# Patient Record
Sex: FEMALE | Race: OTHER | HISPANIC OR LATINO | Employment: UNEMPLOYED | URBAN - METROPOLITAN AREA
[De-identification: names, ages, dates, MRNs, and addresses within clinical notes are randomized per-mention and may not be internally consistent; named-entity substitution may affect disease eponyms.]

---

## 2017-05-31 ENCOUNTER — HISTORICAL (OUTPATIENT)
Dept: ADMINISTRATIVE | Facility: HOSPITAL | Age: 32
End: 2017-05-31

## 2017-05-31 LAB
ABS NEUT (OLG): 5.62 X10(3)/MCL (ref 2.1–9.2)
BASOPHILS # BLD AUTO: 0.03 X10(3)/MCL
BASOPHILS NFR BLD AUTO: 0 % (ref 0–1)
BILIRUB SERPL-MCNC: NEGATIVE MG/DL
BLOOD URINE, POC: NEGATIVE
BUN SERPL-MCNC: 7 MG/DL (ref 7–25)
CALCIUM SERPL-MCNC: 9.3 MG/DL (ref 8.4–10.3)
CHLORIDE SERPL-SCNC: 104 MMOL/L (ref 96–110)
CLARITY, POC UA: CLEAR
CO2 SERPL-SCNC: 24 MMOL/L (ref 24–32)
COLOR, POC UA: NORMAL
CREAT SERPL-MCNC: 0.42 MG/DL (ref 0.7–1.1)
EOSINOPHIL # BLD AUTO: 0.1 X10(3)/MCL
EOSINOPHIL NFR BLD AUTO: 1 % (ref 0–5)
ERYTHROCYTE [DISTWIDTH] IN BLOOD BY AUTOMATED COUNT: 12.7 % (ref 11.5–14.5)
GLUCOSE SERPL-MCNC: 96 MG/DL (ref 65–99)
GLUCOSE UR QL STRIP: NEGATIVE
HBV SURFACE AG SERPL QL IA: NEGATIVE
HCT VFR BLD AUTO: 36.7 % (ref 35–46)
HCV AB SERPL QL IA: NONREACTIVE
HGB BLD-MCNC: 12.6 GM/DL (ref 12–16)
HIV 1+2 AB+HIV1 P24 AG SERPL QL IA: NONREACTIVE
IMM GRANULOCYTES # BLD AUTO: 0.04 10*3/UL
IMM GRANULOCYTES NFR BLD AUTO: 0 %
KETONES UR QL STRIP: NEGATIVE
LEUKOCYTE EST, POC UA: NEGATIVE
LYMPHOCYTES # BLD AUTO: 1.88 X10(3)/MCL
LYMPHOCYTES NFR BLD AUTO: 23 % (ref 15–40)
MCH RBC QN AUTO: 28 PG (ref 26–34)
MCHC RBC AUTO-ENTMCNC: 34.3 GM/DL (ref 31–37)
MCV RBC AUTO: 81.6 FL (ref 80–100)
MONOCYTES # BLD AUTO: 0.38 X10(3)/MCL
MONOCYTES NFR BLD AUTO: 5 % (ref 4–12)
NEUTROPHILS # BLD AUTO: 5.62 X10(3)/MCL
NEUTROPHILS NFR BLD AUTO: 70 X10(3)/MCL
NITRITE, POC UA: NEGATIVE
PH, POC UA: 5
PLATELET # BLD AUTO: 338 X10(3)/MCL (ref 130–400)
PMV BLD AUTO: 10.2 FL (ref 7.4–10.4)
POTASSIUM SERPL-SCNC: 3.5 MMOL/L (ref 3.6–5.2)
PROTEIN, POC: NEGATIVE
RBC # BLD AUTO: 4.5 X10(6)/MCL (ref 4–5.2)
RPR SER QL: NORMAL
SODIUM SERPL-SCNC: 136 MMOL/L (ref 135–146)
SPECIFIC GRAVITY, POC UA: 1.01
UROBILINOGEN, POC UA: NORMAL
WBC # SPEC AUTO: 8 X10(3)/MCL (ref 4.5–11)

## 2017-06-02 LAB — FINAL CULTURE: NO GROWTH

## 2017-07-05 LAB
BILIRUB SERPL-MCNC: NEGATIVE MG/DL
BLOOD URINE, POC: NEGATIVE
CLARITY, POC UA: CLEAR
COLOR, POC UA: YELLOW
GLUCOSE UR QL STRIP: NEGATIVE
KETONES UR QL STRIP: NEGATIVE
LEUKOCYTE EST, POC UA: NEGATIVE
NITRITE, POC UA: NEGATIVE
PH, POC UA: 6.5
PROTEIN, POC: NEGATIVE
SPECIFIC GRAVITY, POC UA: 1.01
UROBILINOGEN, POC UA: NORMAL

## 2017-07-21 ENCOUNTER — HISTORICAL (OUTPATIENT)
Dept: ADMINISTRATIVE | Facility: HOSPITAL | Age: 32
End: 2017-07-21

## 2017-07-21 LAB
ALBUMIN SERPL-MCNC: 3.7 GM/DL (ref 3.4–5)
ALBUMIN/GLOB SERPL: 1 RATIO (ref 1–2)
ALP SERPL-CCNC: 68 UNIT/L (ref 45–117)
ALT SERPL-CCNC: 26 UNIT/L (ref 12–78)
AST SERPL-CCNC: 17 UNIT/L (ref 15–37)
BILIRUB SERPL-MCNC: 0.4 MG/DL (ref 0.2–1)
BILIRUB SERPL-MCNC: NEGATIVE MG/DL
BILIRUBIN DIRECT+TOT PNL SERPL-MCNC: 0.1 MG/DL
BILIRUBIN DIRECT+TOT PNL SERPL-MCNC: 0.3 MG/DL
BLOOD URINE, POC: NEGATIVE
BUN SERPL-MCNC: 6 MG/DL (ref 7–18)
CALCIUM SERPL-MCNC: 9 MG/DL (ref 8.5–10.1)
CHLORIDE SERPL-SCNC: 102 MMOL/L (ref 98–107)
CLARITY, POC UA: CLEAR
CO2 SERPL-SCNC: 26 MMOL/L (ref 21–32)
COLOR, POC UA: NORMAL
CREAT SERPL-MCNC: 0.4 MG/DL (ref 0.6–1.3)
EST. AVERAGE GLUCOSE BLD GHB EST-MCNC: 114 MG/DL
GLOBULIN SER-MCNC: 4 GM/ML (ref 2.3–3.5)
GLUCOSE SERPL-MCNC: 75 MG/DL (ref 74–106)
GLUCOSE UR QL STRIP: NORMAL
HBA1C MFR BLD: 5.6 % (ref 4.2–6.3)
KETONES UR QL STRIP: NEGATIVE
LEUKOCYTE EST, POC UA: NORMAL
NITRITE, POC UA: NEGATIVE
PH, POC UA: 6
POTASSIUM SERPL-SCNC: 3.7 MMOL/L (ref 3.5–5.1)
PROT SERPL-MCNC: 7.7 GM/DL (ref 6.4–8.2)
PROTEIN, POC: NEGATIVE
SODIUM SERPL-SCNC: 137 MMOL/L (ref 136–145)
SPECIFIC GRAVITY, POC UA: 1
UROBILINOGEN, POC UA: NORMAL

## 2017-07-24 ENCOUNTER — HISTORICAL (OUTPATIENT)
Dept: ADMINISTRATIVE | Facility: HOSPITAL | Age: 32
End: 2017-07-24

## 2017-07-24 LAB
BILIRUB SERPL-MCNC: NEGATIVE MG/DL
BLOOD URINE, POC: NEGATIVE
CLARITY, POC UA: NORMAL
COLOR, POC UA: YELLOW
GLUCOSE 1H P 100 G GLC PO SERPL-MCNC: 154 MG/DL
GLUCOSE UR QL STRIP: NEGATIVE
KETONES UR QL STRIP: NEGATIVE
LEUKOCYTE EST, POC UA: NORMAL
NITRITE, POC UA: NEGATIVE
PH, POC UA: 6.5
PROTEIN, POC: NORMAL
SPECIFIC GRAVITY, POC UA: 1.01
UROBILINOGEN, POC UA: NORMAL

## 2017-08-07 ENCOUNTER — HISTORICAL (OUTPATIENT)
Dept: ADMINISTRATIVE | Facility: HOSPITAL | Age: 32
End: 2017-08-07

## 2017-08-07 LAB
GLUCOSE 1H P 100 G GLC PO SERPL-MCNC: 135 MG/DL
GLUCOSE 2H P 100 G GLC PO SERPL-MCNC: 112 MG/DL (ref 65–139)
GLUCOSE 3H P 100 G GLC PO SERPL-MCNC: 69 MG/DL
GLUCOSE P FAST SERPL-MCNC: 84 MG/DL (ref 65–99)

## 2017-08-14 ENCOUNTER — HISTORICAL (OUTPATIENT)
Dept: FAMILY MEDICINE | Facility: CLINIC | Age: 32
End: 2017-08-14

## 2017-08-14 LAB
BILIRUB SERPL-MCNC: NEGATIVE MG/DL
BLOOD URINE, POC: NEGATIVE
CLARITY, POC UA: CLEAR
COLOR, POC UA: NORMAL
GLUCOSE UR QL STRIP: NEGATIVE
KETONES UR QL STRIP: NEGATIVE
LEUKOCYTE EST, POC UA: NORMAL
NITRITE, POC UA: NEGATIVE
PH, POC UA: 5
PROTEIN, POC: NEGATIVE
SPECIFIC GRAVITY, POC UA: 1.02
UROBILINOGEN, POC UA: NORMAL

## 2017-09-15 LAB
BILIRUB SERPL-MCNC: NEGATIVE MG/DL
BLOOD URINE, POC: NEGATIVE
CLARITY, POC UA: NORMAL
COLOR, POC UA: YELLOW
GLUCOSE UR QL STRIP: NORMAL
KETONES UR QL STRIP: NEGATIVE
LEUKOCYTE EST, POC UA: NORMAL
NITRITE, POC UA: NEGATIVE
PH, POC UA: 6.5
PROTEIN, POC: NEGATIVE
SPECIFIC GRAVITY, POC UA: 1.01
UROBILINOGEN, POC UA: NORMAL

## 2017-10-12 ENCOUNTER — HISTORICAL (OUTPATIENT)
Dept: ADMINISTRATIVE | Facility: HOSPITAL | Age: 32
End: 2017-10-12

## 2017-10-12 LAB
APPEARANCE, UA: NORMAL
BACTERIA #/AREA URNS AUTO: ABNORMAL /[HPF]
BILIRUB SERPL-MCNC: NEGATIVE MG/DL
BILIRUB UR QL STRIP: NEGATIVE
BLOOD URINE, POC: NEGATIVE
CLARITY, POC UA: CLEAR
COLOR UR: YELLOW
COLOR, POC UA: YELLOW
GLUCOSE (UA): NORMAL
GLUCOSE UR QL STRIP: NEGATIVE
HGB UR QL STRIP: NEGATIVE
HYALINE CASTS #/AREA URNS LPF: ABNORMAL /[LPF]
KETONES UR QL STRIP: NEGATIVE
KETONES UR QL STRIP: NEGATIVE
LEUKOCYTE EST, POC UA: NEGATIVE
LEUKOCYTE ESTERASE UR QL STRIP: NEGATIVE
NITRITE UR QL STRIP: NEGATIVE
NITRITE, POC UA: NEGATIVE
PH UR STRIP: 7 [PH] (ref 4.5–8)
PH, POC UA: 7.5
PROT UR QL STRIP: NEGATIVE
PROTEIN, POC: NEGATIVE
RBC #/AREA URNS AUTO: ABNORMAL /[HPF]
SP GR UR STRIP: 1.01 (ref 1–1.03)
SPECIFIC GRAVITY, POC UA: 1.01
SQUAMOUS #/AREA URNS LPF: ABNORMAL /[LPF]
UROBILINOGEN UR STRIP-ACNC: NORMAL
UROBILINOGEN, POC UA: NORMAL
WBC #/AREA URNS AUTO: ABNORMAL /HPF

## 2017-10-14 LAB — FINAL CULTURE: NO GROWTH

## 2017-11-02 LAB
BILIRUB SERPL-MCNC: NEGATIVE MG/DL
BLOOD URINE, POC: NEGATIVE
CLARITY, POC UA: NORMAL
COLOR, POC UA: NORMAL
GLUCOSE UR QL STRIP: NEGATIVE
KETONES UR QL STRIP: NEGATIVE
LEUKOCYTE EST, POC UA: NORMAL
NITRITE, POC UA: NEGATIVE
PH, POC UA: 6
PROTEIN, POC: NORMAL
SPECIFIC GRAVITY, POC UA: 1.02
UROBILINOGEN, POC UA: NORMAL

## 2017-11-17 LAB
BILIRUB SERPL-MCNC: NEGATIVE MG/DL
BLOOD URINE, POC: NEGATIVE
CLARITY, POC UA: NORMAL
COLOR, POC UA: YELLOW
GLUCOSE UR QL STRIP: NEGATIVE
KETONES UR QL STRIP: NEGATIVE
LEUKOCYTE EST, POC UA: NEGATIVE
NITRITE, POC UA: NEGATIVE
PH, POC UA: 6.5
PROTEIN, POC: NORMAL
SPECIFIC GRAVITY, POC UA: 1
UROBILINOGEN, POC UA: NORMAL

## 2017-11-28 ENCOUNTER — HISTORICAL (OUTPATIENT)
Dept: ADMINISTRATIVE | Facility: HOSPITAL | Age: 32
End: 2017-11-28

## 2017-11-28 LAB
APPEARANCE, UA: ABNORMAL
BACTERIA #/AREA URNS AUTO: ABNORMAL /[HPF]
BILIRUB SERPL-MCNC: NEGATIVE MG/DL
BILIRUB UR QL STRIP: NEGATIVE
BLOOD URINE, POC: NEGATIVE
CLARITY, POC UA: NORMAL
COLOR UR: YELLOW
COLOR, POC UA: YELLOW
GLUCOSE (UA): NORMAL
GLUCOSE UR QL STRIP: NEGATIVE
HGB UR QL STRIP: NEGATIVE
HYALINE CASTS #/AREA URNS LPF: ABNORMAL /[LPF]
KETONES UR QL STRIP: NEGATIVE
KETONES UR QL STRIP: NEGATIVE
LEUKOCYTE EST, POC UA: NORMAL
LEUKOCYTE ESTERASE UR QL STRIP: 500 LEU/UL
NITRITE UR QL STRIP: NEGATIVE
NITRITE, POC UA: NEGATIVE
PH UR STRIP: 6 [PH] (ref 4.5–8)
PH, POC UA: 6.5
PROT UR QL STRIP: NEGATIVE
PROTEIN, POC: NORMAL
RBC #/AREA URNS AUTO: ABNORMAL /[HPF]
SP GR UR STRIP: 1.01 (ref 1–1.03)
SPECIFIC GRAVITY, POC UA: 1.01
SQUAMOUS #/AREA URNS LPF: >100 /[LPF]
UROBILINOGEN UR STRIP-ACNC: NORMAL
UROBILINOGEN, POC UA: NORMAL
WBC #/AREA URNS AUTO: ABNORMAL /HPF

## 2017-11-30 LAB — FINAL CULTURE: NORMAL

## 2017-12-13 LAB
BILIRUB SERPL-MCNC: NEGATIVE MG/DL
BLOOD URINE, POC: NEGATIVE
CLARITY, POC UA: CLEAR
COLOR, POC UA: YELLOW
GLUCOSE UR QL STRIP: NEGATIVE
KETONES UR QL STRIP: NEGATIVE
LEUKOCYTE EST, POC UA: NORMAL
NITRITE, POC UA: NEGATIVE
PH, POC UA: 6
PROTEIN, POC: NORMAL
SPECIFIC GRAVITY, POC UA: 1.02
UROBILINOGEN, POC UA: NORMAL

## 2017-12-27 ENCOUNTER — HISTORICAL (OUTPATIENT)
Dept: ADMINISTRATIVE | Facility: HOSPITAL | Age: 32
End: 2017-12-27

## 2017-12-27 LAB
ABS NEUT (OLG): 7.23 X10(3)/MCL (ref 2.1–9.2)
BASOPHILS # BLD AUTO: 0.04 X10(3)/MCL
BASOPHILS NFR BLD AUTO: 0 % (ref 0–1)
BILIRUB SERPL-MCNC: NEGATIVE MG/DL
BLOOD URINE, POC: NEGATIVE
CLARITY, POC UA: CLEAR
COLOR, POC UA: YELLOW
EOSINOPHIL # BLD AUTO: 0.09 X10(3)/MCL
EOSINOPHIL NFR BLD AUTO: 1 % (ref 0–5)
ERYTHROCYTE [DISTWIDTH] IN BLOOD BY AUTOMATED COUNT: 13 % (ref 11.5–14.5)
GLUCOSE UR QL STRIP: NEGATIVE
HCT VFR BLD AUTO: 37.4 % (ref 35–46)
HGB BLD-MCNC: 12.9 GM/DL (ref 12–16)
HIV 1+2 AB+HIV1 P24 AG SERPL QL IA: NONREACTIVE
IMM GRANULOCYTES # BLD AUTO: 0.14 10*3/UL
IMM GRANULOCYTES NFR BLD AUTO: 1 %
LEUKOCYTE EST, POC UA: NORMAL
LYMPHOCYTES # BLD AUTO: 1.83 X10(3)/MCL
LYMPHOCYTES NFR BLD AUTO: 18 % (ref 15–40)
MCH RBC QN AUTO: 28.7 PG (ref 26–34)
MCHC RBC AUTO-ENTMCNC: 34.5 GM/DL (ref 31–37)
MCV RBC AUTO: 83.3 FL (ref 80–100)
MONOCYTES # BLD AUTO: 0.55 X10(3)/MCL
MONOCYTES NFR BLD AUTO: 6 % (ref 4–12)
NEUTROPHILS # BLD AUTO: 7.23 X10(3)/MCL
NEUTROPHILS NFR BLD AUTO: 73 X10(3)/MCL
NITRITE, POC UA: NEGATIVE
PH, POC UA: 7
PLATELET # BLD AUTO: 245 X10(3)/MCL (ref 130–400)
PMV BLD AUTO: 10.4 FL (ref 7.4–10.4)
PROTEIN, POC: NORMAL
RBC # BLD AUTO: 4.49 X10(6)/MCL (ref 4–5.2)
SPECIFIC GRAVITY, POC UA: 1.01
T PALLIDUM AB SER QL: NONREACTIVE
UROBILINOGEN, POC UA: NORMAL
WBC # SPEC AUTO: 9.9 X10(3)/MCL (ref 4.5–11)

## 2017-12-29 LAB — FINAL CULTURE: NORMAL

## 2018-01-04 LAB
BILIRUB SERPL-MCNC: NORMAL MG/DL
BLOOD URINE, POC: NEGATIVE
CLARITY, POC UA: NORMAL
COLOR, POC UA: NORMAL
GLUCOSE UR QL STRIP: NEGATIVE
KETONES UR QL STRIP: NEGATIVE
LEUKOCYTE EST, POC UA: NORMAL
NITRITE, POC UA: NEGATIVE
PH, POC UA: 6.5
PROTEIN, POC: NORMAL
SPECIFIC GRAVITY, POC UA: 1.02
UROBILINOGEN, POC UA: NORMAL

## 2018-01-18 ENCOUNTER — HISTORICAL (OUTPATIENT)
Dept: ADMINISTRATIVE | Facility: HOSPITAL | Age: 33
End: 2018-01-18

## 2018-01-18 LAB
APPEARANCE, UA: ABNORMAL
BACTERIA #/AREA URNS AUTO: ABNORMAL /[HPF]
BILIRUB UR QL STRIP: NEGATIVE
COLOR UR: YELLOW
GLUCOSE (UA): NORMAL
HGB UR QL STRIP: 1 MG/DL
HYALINE CASTS #/AREA URNS LPF: ABNORMAL /[LPF]
KETONES UR QL STRIP: NEGATIVE
LEUKOCYTE ESTERASE UR QL STRIP: 500 LEU/UL
MUCOUS THREADS URNS QL MICRO: ABNORMAL
NITRITE UR QL STRIP: NEGATIVE
PH UR STRIP: 5.5 [PH] (ref 4.5–8)
PROT UR QL STRIP: 50 MG/DL
RBC #/AREA URNS AUTO: >=100 /[HPF]
SP GR UR STRIP: 1.03 (ref 1–1.03)
SQUAMOUS #/AREA URNS LPF: ABNORMAL /[LPF]
UROBILINOGEN UR STRIP-ACNC: 2 MG/DL
WBC #/AREA URNS AUTO: ABNORMAL /HPF

## 2018-01-20 LAB — FINAL CULTURE: NORMAL

## 2022-04-11 ENCOUNTER — HISTORICAL (OUTPATIENT)
Dept: ADMINISTRATIVE | Facility: HOSPITAL | Age: 37
End: 2022-04-11

## 2022-04-25 VITALS
SYSTOLIC BLOOD PRESSURE: 129 MMHG | DIASTOLIC BLOOD PRESSURE: 86 MMHG | OXYGEN SATURATION: 98 % | WEIGHT: 156.63 LBS | HEIGHT: 68 IN | BODY MASS INDEX: 23.74 KG/M2

## 2022-04-30 NOTE — PROGRESS NOTES
Patient:   Anastasia Edmondson             MRN: [id number]            FIN: [id number]               Age:   32 years     Sex:  Female     :  1985   Associated Diagnoses:   None   Author:   Haritha Harding MD      Visit Information   Visit type:  Scheduled follow-up.    Accompanied by:  No one.    Source of history:  Self, patient translated through Dr. Good.    History limitation:  None.       Chief Complaint   OB follow up      History of Present Illness   31 year old   female @ 32^2 EGA with EDC of 18 confirmed by 1st trimester ultrasound presents for antepartum visit.   Patient is Emir[place] speaking only, Dr Good was there to translate for the entire clinical encounter.     Chronic Issue and Treatment: none    Acute complaints: Round ligament pain mainly in left lower quadrant, denies dysuria or increased urinary frequency. Compliant with prenatal vitamins.    OB Review of Systems  Fetal movements: yes  Vaginal bleeding: denies  Vaginal discharge: denies  Loss of fluid: denies  Contractions: denies  Headaches: denies  Vision changes: denies  Edema: denies    Pregnancy History:  - G1: 10/2012  at 37WGA, viable female infant 3629 gm  - G2:  SAB  - G3: current  GYN History: ? at menarche, periods [id number], moderate; LMP 4/10 or 26/17; no h/o abnormal Paps, no h/o STDs  Medical History: none  Surgical History: none  Social History: Denies tobacco/drugs/alcohol  Medications: PNVs      Physical Examination   Vital Signs   2017 10:42 CST     Temperature Oral          36.6 DegC                             Temperature Oral (calculated)             97.88 DegF                             Peripheral Pulse Rate     76 bpm                             Respiratory Rate          16 br/min                             SpO2                      97 %                             Systolic Blood Pressure   126 mmHg                             Diastolic Blood Pressure  76 mmHg                              Blood Pressure Location   Right arm                             Blood Pressure Cuff Size  Adult small     General:  Alert and oriented, No acute distress.    Eye:  Pupils are equal, round and reactive to light, Extraocular movements are intact, Normal conjunctiva.    HENT:  Tympanic membranes are clear, Oral mucosa is moist, No pharyngeal erythema, No sinus tenderness.    Neck:  Supple, Non-tender.    Respiratory:  Lungs are clear to auscultation, Respirations are non-labored, Breath sounds are equal, Symmetrical chest wall expansion.    Cardiovascular:  Normal rate, Regular rhythm, Good pulses equal in all extremities, No edema.    Gastrointestinal:  Soft, Non-distended, gravid, mild generalized tenderness.    Obstetric Exam     Uterus: fundal height 32  cm, consistent with gestational age.     Johns/ Baby A fetal evaluation: heart tones 140  bpm not with Doppler scan.     no fundal tenderness.     Musculoskeletal:  No CVA tenderness.    Integumentary:  Warm, Dry, No rash.    Neurologic:  Alert, Oriented, No focal deficits.    Cognition and Speech:  Oriented.    Psychiatric:  Cooperative.       Review / Management   Inital OB Labs:  Blood Type and Rh: O POS  Antibody Screen: Negative  CBC H/H: 12.7/36.8  HIV: Negative  RPR: Negative  GC: not detected  CT: not detected  HBsAg: NR  Rubella: Immune  Varicella: Immune  UA & Culture: No growth  HCVAb: NR  Sickle Cell Screen: Negative  PAP: deferred - per patient completed in October 2016 - normal  Influenza vaccine date: 11/2017  15-20 Weeks Lab  Quad Screen: negative  28 Week Lab  1H GTT: 154, 3H GTT: 135, 112, 69  Date of Rhogam if indicated: _  Date of Tdap: _  36 Week Lab  GBS Culture:   Results review:  Lab results   11/28/2017 9:50 CST      Urine Color Urine Dipstick                Yellow                             Urine Appearance Urine Dipstick           Cloudy                             pH Urine Dipstick         6.5                              Specific Gravity Urine Dipstick           1.015                             Blood Urine Dipstick      Negative                             Glucose Urine Dipstick    Negative                             Ketones Urine Dipstick    Negative                             Protein Urine Dipstick    Trace                             Bilirubin Urine Dipstick  Negative                             Urobilinogen Urine Dipstick               0.2 mg/dl                             Leukocytes Urine Dipstick Moderate                             Nitrite Urine Dipstick    Negative  .       Impression and Plan   Diagnosis   32 year old  female @ 32^2 EGA:    IUP  - continue PNV  - urine dip as above, moderate leukocytes - will treat empirically with Macrobid 100 mg BID, complete U/A and urine cultures ordered  - plans to breastfeed  - discussed postpartum contraception, patient considering OCPs  - Quad screen negative   - Labor precautions given - instructed to report to [facility] if any bleeding, gush of fluid, contractions    Return to Clinic 2 weeks with PCP Dr. Mckinney

## 2022-04-30 NOTE — PROGRESS NOTES
Patient:   Anastasia Edmondson             MRN: [id number]            FIN: [id number]               Age:   31 years     Sex:  Female     :  1985   Associated Diagnoses:   None   Author:   Eliud Brewer MD      Physician: DAVID  Reason for Exam: INITIAL PRENATAL VISIT; DATING US  : 3  Parity: 1-0-1-1  Gestational Age: 7w2d  EDC: 01/15/2017    Examination:  ALEXANDRE: ADEQUATE  Fetal Tone: PRESENT  Fetal Movement:   Fetal Heart Rate: 167  Fetus: SINGLE  Presentation:   Yolk sac: PRESENT  Placenta:       Position: WRAP      Grade:     Measurement:  CRL: 0.58cm  EGA: 6w3d  EDC: 2018    Comments: Early 1st trimester intrauterine pregnancy measuring CRL: 0.58cm; EGA: 6w3d for EDC of 2018. FHR; 167.  Viable 1st trimester pregnancy. ASSIGN EDC: 2018

## 2022-04-30 NOTE — PROGRESS NOTES
Patient:   Anastasia Edmondson             MRN: [id number]            FIN: [id number]               Age:   31 years     Sex:  Female     :  1985   Associated Diagnoses:   None   Author:   Jud Vieira MD      History of Present Illness   31 year old  female @ 6w3d EGA with EDC of 18 confirmed by 1st trimester ultrasound [place] [facility] [place]    Chronic Issue and Treatment: none    Acute complaints: Continues to have spotting, taking PNVs    OB Review of Systems  Fetal movements: denies  Vaginal bleeding: spotting, likely implantation bleeding  Vaginal discharge:   Loss of fluid: denies  Contractions: denies  Headaches: denies  Vision changes: denies  Edema: denies    Pregnancy History:  - G1: 10/2012  at 37WGA, viable female infant 3629gm  - G2:  SAB  - G3: current  GYN History: ? at menarche, periods [id number], moderate; LMP 4/10 or 26/17; no h/o abnormal Paps, no h/o STDs  Medical History: none  Surgical History: none  Social History: Denies tobacco/drugs/alcohol  Medications: none      Review of Systems   Constitutional:  Negative, No fever, No chills, No fatigue, No night sweats.    Ear/Nose/Mouth/Throat:  Negative, No nasal congestion, No sore throat.    Respiratory:  Negative, No shortness of breath, No cough, No wheezing.    Cardiovascular:  Negative, No chest pain.    Gastrointestinal:  Negative, No nausea, No vomiting, No diarrhea, No constipation.    Genitourinary:  Negative.    Musculoskeletal:  Negative, No joint pain, No muscle pain.    Integumentary:  Negative, No rash.    Neurologic:  Negative.    Psychiatric:  Negative.       Health Status   Allergies:    Allergic Reactions (Selected)  No Known Medication Allergies,    Allergies (1) Active Reaction  No Known Medication Allergies None Documented     Current medications:  (Selected)   Prescriptions  Prescribed  Prenatal Multivitamin/Folic Acid 0.4 mg oral tablet ([facility] Substitution): 1  tab(s), Oral, Daily, [facility] Formulary Sub. with Generic Brand, # 90 tab(s), 0 Refill(s)  Reglan 5 mg oral tablet: 5 mg = 1 tab(s), Oral, QID, X 5 day(s), # 20 tab(s), 0 Refill(s),    Home Medications (2) Active  Prenatal Multivitamin/Folic Acid 0.4 mg oral tablet ([facility] Substitution) 1 tab(s), Oral, Daily  Reglan 5 mg oral tablet 5 mg = 1 tab(s), Oral, QID     Problem list:    All Problems  [phone] Confirmed,    Active Problems (1)  Pregnant         Histories   Family History:    No family history items have been selected or recorded.   Procedure history:    none.   Social History        Social & Psychosocial Habits    Alcohol  05/20/2017  Use: Never    Substance Abuse  05/20/2017  Use: Never    Tobacco  05/20/2017  Use: Never smoker  .        Physical Examination   Vital Signs   5/31/2017 9:36 CDT       Temperature Oral          36.6 DegC                             Peripheral Pulse Rate     58 bpm  LOW                             Respiratory Rate          18 br/min                             SpO2                      100 %                             Systolic Blood Pressure   103 mmHg                             Diastolic Blood Pressure  72 mmHg     General:  Alert and oriented, No acute distress.    Respiratory:  Lungs are clear to auscultation, Respirations are non-labored, Breath sounds are equal, Symmetrical chest wall expansion.    Cardiovascular:  Normal rate, Regular rhythm, No murmur, Good pulses equal in all extremities, No edema.    Gastrointestinal:  Soft, Non-tender, Non-distended, Normal bowel sounds, gravid.    Obstetric Exam     Johns/ Baby A fetal evaluation: heart tones (within normal limits (110 to 160 bpm), observed on US).     Integumentary:  Warm, Dry.    Neurologic:  Alert, Oriented, No focal deficits.    Cognition and Speech:  Oriented.    Psychiatric:  Cooperative, Appropriate mood & affect, Normal judgment.       Health Maintenance      Health Maintenance      Pending (in the next year)        Due            Alcohol Misuse Screening due  05/31/17  and every 1  year(s)           Cervical Cancer Screening due  05/31/17  and every 5  year(s)           Depression Screening due  05/31/17  and every 1  year(s)           HIV Screening due  05/31/17  and every 1  year(s)           Tetanus Vaccine due  05/31/17  and every 10  year(s)        Due In Future            Influenza Vaccine not due until  10/02/17  and every 1  year(s)           Body Mass Index Check not due until  05/20/18  and every 1  year(s)           Obesity Screening not due until  05/20/18  and every 1  year(s)           Blood Pressure Screening not due until  05/27/18  and every 1  year(s)     Satisfied (in the past 1 year)        Satisfied            Blood Pressure Screening on  05/27/17.  Satisfied by Richard Giraldo RN           Body Mass Index Check on  05/20/17.  Satisfied by Angella Jorgensen RN           Obesity Screening on  05/20/17.  Satisfied by Angella Jorgensen RN          Review / Management     Ultrasound   1st Trimester US Date/Location: 5/23/17  Impression:   1. IUP with estimated gestational age of less than 5 weeks.  2. Small focus of subchorionic hemorrhage inferior to the gestational  sac.  3. The right ovary contains a heterogeneous structure consistent with  a corpus luteum.    1st Trimester US Date/Location: 5/27/17  Impression:   Again intrauterine gestational sac and yolk sac with no convincing  fetal pole seen today. Recommend follow-up scan in 2 weeks to document  viable embryo. Small perigestational hemorrhage.    1st Trimester US Date/Location: 5/31/17  Impression:    Early 1st trimester intrauterine pregnancy measuring CRL: 0.58cm; EGA: 6w3d for EDC of 01/21/2018. FHR; 167.  Viable 1st trimester pregnancy. ASSIGN EDC: 01/21/2018       US for Fetal Anomalies Date/Location: _    _ WGA at time of US   Impression: _    Inital OB Labs (ordered today)  Blood Type and Rh: O  POS  Antibody Screen: _  CBC H/H: 12.7/36.8  HIV: _  RPR: _  GC: not detected  CT: not detected  HBsAg: _  Rubella: _  Varicella: _  UA & Culture: _  HCVAb: _  Sickle Cell Screen: _  PAP: _  Influenza vaccine date: not done  15-20 Weeks Lab  Quad Screen: _  28 Week Lab  1H GTT: _  Date of Rhogam if indicated: _  Date of Tdap: _  36 Week Lab  GBS Culture: _        Results review:  All Results   2017 10:14 CDT      Urine Color Urine Dipstick                Dark yellow                             Urine Appearance Urine Dipstick           Clear                             pH Urine Dipstick         5                             Specific Gravity Urine Dipstick           1.015                             Blood Urine Dipstick      Negative                             Glucose Urine Dipstick    Negative                             Ketones Urine Dipstick    Negative                             Protein Urine Dipstick    Negative                             Bilirubin Urine Dipstick  Negative                             Urobilinogen Urine Dipstick               0.2 mg/dl                             Leukocytes Urine Dipstick Negative                             Nitrite Urine Dipstick    Negative  .       Impression and Plan   Diagnosis   31 year old  female @ 6w3d EGA:    1. IUP   - continue PNV   - routine lab orders   - urine dip as above   - needs Pap at next visit (unable to perform today 2/2 transvaginal US)   - plans to breastfeed   - discussed postpartum contraception, patient considering OCPs   - WIC/ER precautions provided    Assign to Resident, Return to Clinic 4 Weeks     services used

## 2022-04-30 NOTE — PROGRESS NOTES
Patient:   Anastasia Edmondson             MRN: [id number]            FIN: [id number]               Age:   32 years     Sex:  Female     :  1985   Associated Diagnoses:   None   Author:   Sosa Mckinney MD      Chief Complaint   10/12/2017 9:23 CDT      prenatal care with complaints of pain in abdomen and lower back as well as clear stringy discharge with blood        History of Present Illness   31 year old   female @ 25^4 EGA with EDC of 18 confirmed by 1st trimester ultrasound presents for antepartum visit.   Patient is Azeri speaking only,  was used for entire clinical encounter.     Chronic Issue and Treatment: none    Acute complaints: Reports 4 day history of generalized abdominal pain and back pain. Patient had difficulty describing pain. States it is constant. Sometimes associated with tightness of abdomen, but unable to quantify frequency. Associated with increased clear vaginal discharge with intermittent red streaks x 4 days. Reports burning with urination x 1 day. Denies any recent sexual intercourse. No fever, chills, nausea, vomiting, constipation, or diarrhea.    OB Review of Systems  Fetal movements: yes  Vaginal bleeding: see HPI   Vaginal discharge: yes  Loss of fluid: denies  Contractions: denies  Headaches: denies  Vision changes: denies  Edema: denies    Pregnancy History:  - G1: 10/2012  at 37WGA, viable female infant 3629gm  - G2:  SAB  - G3: current  GYN History: ? at menarche, periods [id number], moderate; LMP 4/10 or 26/17; no h/o abnormal Paps, no h/o STDs  Medical History: none  Surgical History: none  Social History: Denies tobacco/drugs/alcohol  Medications: PNVs      Physical Examination   Vital Signs   10/12/2017 9:23 CDT      Temperature Oral          36.5 DegC                             Peripheral Pulse Rate     84 bpm                             Respiratory Rate          16 br/min                             SpO2                       98 %                             Saturation Probe Site     Hand, left                             Systolic Blood Pressure   95 mmHg                             Diastolic Blood Pressure  62 mmHg                             Mean Arterial Pressure, Cuff              73 mmHg                             Blood Pressure Location   Left arm                             Blood Pressure Cuff Size  Adult     General:  Alert and oriented, No acute distress.    Eye:  Pupils are equal, round and reactive to light, Extraocular movements are intact, Normal conjunctiva.    HENT:  Tympanic membranes are clear, Oral mucosa is moist, No pharyngeal erythema, No sinus tenderness.    Neck:  Supple, Non-tender.    Respiratory:  Lungs are clear to auscultation, Respirations are non-labored, Breath sounds are equal, Symmetrical chest wall expansion.    Cardiovascular:  Normal rate, Regular rhythm, Good pulses equal in all extremities, No edema.    Gastrointestinal:  Soft, Non-distended, gravid, mild generalized tenderness.    Obstetric Exam     Uterus: fundal height 24  cm, consistent with gestational age.     Johns/ Baby A fetal evaluation: heart tones (154  bpm not with Doppler scan, midline under umbilicus).     Vagina: discharge (moderate amount, watery, white, no odor appreciated, mild cervical tenderness).     Cervix: dilated fingertip.     no fundal tenderness.     Musculoskeletal:  No CVA tenderness.    Integumentary:  Warm, Dry, No rash.    Neurologic:  Alert, Oriented, No focal deficits.    Cognition and Speech:  Oriented.    Psychiatric:  Cooperative.       Review / Management     Ultrasound  1st Trimester US Date/Location: 5/23/17  Impression:   1. IUP with estimated gestational age of less than 5 weeks.  2. Small focus of subchorionic hemorrhage inferior to the gestational sac.  3. The right ovary contains a heterogeneous structure consistent with a corpus luteum.    1st Trimester US Date/Location:  5/27/17  Impression: Again intrauterine gestational sac and yolk sac with no convincing fetal pole seen today. Recommend follow-up scan in 2 weeks to document viable embryo. Small perigestational hemorrhage.    1st Trimester US Date/Location: 5/31/17  Impression:  Early 1st trimester intrauterine pregnancy measuring CRL: 0.58cm; EGA: 6w3d for EDC of 01/21/2018. FHR; 167.  Viable 1st trimester pregnancy. ASSIGN EDC: 01/21/2018    US for Fetal Anomalies Date/Location: 9/7/17  Impression: 2nd trimester intrauterine pregnancy measuring 21w3d fo rEDC of 01/15/2018 consistent with assigned date of 01/21/2018. Ratio calculations within range for symmetrical development.  EFW  is 424.24gm [15oz] appropriate for gestational age. FHR: 156; Gender: Female. No fetal anomalies noted    Inital OB Labs   Blood Type and Rh: O POS  Antibody Screen: Negative  CBC H/H: 12.7/36.8  HIV: Negative  RPR: Negative  GC: not detected  CT: not detected  HBsAg: NR  Rubella: Immune  Varicella: Immune  UA & Culture: No growth  HCVAb: NR  Sickle Cell Screen: Negative  PAP: deferred - per patient completed in October 2016 - normal  Influenza vaccine date: not done  15-20 Weeks Lab  Quad Screen: negative  28 Week Lab  1H GTT: _  Date of Rhogam if indicated: _  Date of Tdap: _  36 Week Lab  GBS Culture: _        Results review:  Lab results   10/12/2017 9:20 CDT      Urine Color Urine Dipstick                Yellow                             Urine Appearance Urine Dipstick           Clear                             pH Urine Dipstick         7.5                             Specific Gravity Urine Dipstick           1.010                             Blood Urine Dipstick      Negative                             Glucose Urine Dipstick    Negative                             Ketones Urine Dipstick    Negative                             Protein Urine Dipstick    Negative                             Bilirubin Urine Dipstick  Negative                              Urobilinogen Urine Dipstick               0.2 mg/dl                             Leukocytes Urine Dipstick Negative                             Nitrite Urine Dipstick    Negative  .       Impression and Plan   Diagnosis   32 year old  female @ 25^4 EGA:    IUP  - continue PNV  - urine dip as above  - plans to breastfeed  - discussed postpartum contraception, patient considering OCPs  - Quad screen negative   - Fetal Monitor: no organized uterine activity noted  - 1 hour GTT at next visit  - Influenza vaccine at next visit    Bacterial Vaginosis  - Wet prep with a few clue cells  - Urine culutre pending  - Rx: Metronidazole 500 mg BID x 7 days. Instructed to drink water frequently throughout day. Instructed to avoid alcohol while taking medication  - Strict ED/Clinic precautions given    ED precautions given   Return to Clinic 1 week follow up BV

## 2022-05-05 NOTE — HISTORICAL [facility] CERNER
This is a historical note converted from Ilene. Formatting and pictures may have been removed.  Please reference Ilene for original formatting and attached multimedia. Chief Complaint  postpartum visit, c/o dysuria  History of Present Illness  33 yo   female, underwent a successful 1LTCS for breech presentation at ?38^2 WGA on 18. Reports?dysuria and blood in?urine. Intermittent lower back pain. No fever or chills. No nausea, or vomiting.?Reports lochia decreased. Mood stable, no SI/HI. Denies postpartum contraception. Breastfeeding without any problems. Pain controlled with ibuprofen and Percocet.  ?   Patient is Singaporean speaking only,  used for entire clinical encounter.  Review of Systems  Gen: No fever, chills  CV: No CP, palpitations  Resp: No cough, SOB  GI: No N/V/D  : See HPI  Skin: No rash  all other systems negative  Physical Exam  Vitals & Measurements  T:?37? ?C ?(Oral)? HR:?67?(Peripheral)? RR:?16? BP:?129/86? SpO2:?98%?  HT:?172?cm? HT:?172?cm? WT:?71.05?kg? BMI:?24.02?  Gen: alert, oriented, no acute distress  HEENT: PERRL  Neck: supple  CV: RRR, no edema  Resp: CTAB, nonlabored  GI: soft, NT, ND. incision d/c/i. stables in place. no signs of infection  : No CVA tenderness  Skin: No rash  Psych: cooperative  Assessment/Plan  Postpartum visit  - Doing well. Mood stable. No SI/HI.  - Continue breastfeeding  - Continue PNV daily  - Continue Percocet and ibuprofen as needed for pain  - Denies postpartum contraception  - Stable removed today. steri strips in place.  ?  UTI  - Urinalysis and?urine culture pending  - Patient symptomatic with dysuria  - Rx: Keflex?500 mg BID x 10 days  - Encourage adequate oral hydration ?  - Clinic/ED precautions given  ?  ?RTC in?5 weeks?with Dr. Banda   Problem List/Past Medical History  Ongoing  Encounter for fetal anatomic survey  Encounter to determine fetal viability of  pregnancy  Historical  Pregnant  Pregnant  Pregnant  Procedure/Surgical History   delivery only; (2018)   Section (None) (2018)  Extraction of Products of Conception, Low Cervical, Open Approach (2018)  none  Medications  Colace 100 mg oral capsule, 100 mg= 1 cap(s), Oral, BID, PRN, 1 refills  dextromethorphan 20 mg/15 mL oral syrup, 5 mg= 3.75 mL, Oral, q4hr, PRN  ferrous gluconate 324 mg (38 mg elemental iron) oral tablet, 1 tab(s), Oral, Daily  ibuprofen 600 mg oral tablet, 600 mg= 1 tab(s), Oral, q6hr  Percocet 5/325 oral tablet, 1 tab(s), Oral, q6hr, PRN  Prenatal AD oral tablet, 1 tab(s), Oral, Daily  Allergies  No Known Medication Allergies  Social History  Alcohol - 2017  Never  Substance Abuse - 2017  Never  Tobacco - 2017  Never smoker  Family History  Family history is negative      I have personally reviewed the review of systems (ROS) and past, family and social histories (PFSH) documented above by the resident.  Documentation of physical examination, findings and/or medical decisions performed by resident (with >6 months approved residency time) or midlevel provider.  I have reviewed the care furnished by the resident during, the encounter, including a review of the patient?s medical history, the resident?s findings on physical examination, diagnosis, and the treatment plan.  I participated in the management of the patient and was immediately available throughout the encounter.?  Services were furnished in a primary care center located in the outpatient department of a [facility].  ?

## 2022-06-18 PROCEDURE — 99284 EMERGENCY DEPT VISIT MOD MDM: CPT | Mod: 25

## 2022-06-19 ENCOUNTER — HOSPITAL ENCOUNTER (EMERGENCY)
Facility: HOSPITAL | Age: 37
Discharge: HOME | End: 2022-06-19
Attending: FAMILY MEDICINE

## 2022-06-19 VITALS
OXYGEN SATURATION: 98 % | RESPIRATION RATE: 16 BRPM | TEMPERATURE: 99 F | DIASTOLIC BLOOD PRESSURE: 75 MMHG | HEART RATE: 59 BPM | BODY MASS INDEX: 27.36 KG/M2 | SYSTOLIC BLOOD PRESSURE: 151 MMHG | WEIGHT: 164.25 LBS | HEIGHT: 65 IN

## 2022-06-19 DIAGNOSIS — N61.1 BREAST ABSCESS: Primary | ICD-10-CM

## 2022-06-19 LAB
B-HCG UR QL: NEGATIVE
CTP QC/QA: YES

## 2022-06-19 PROCEDURE — 81025 URINE PREGNANCY TEST: CPT | Performed by: PHYSICIAN ASSISTANT

## 2022-06-19 RX ORDER — SULFAMETHOXAZOLE AND TRIMETHOPRIM 800; 160 MG/1; MG/1
1 TABLET ORAL 2 TIMES DAILY
Qty: 14 TABLET | Refills: 0 | Status: SHIPPED | OUTPATIENT
Start: 2022-06-19 | End: 2022-06-26

## 2022-06-19 RX ORDER — TRAMADOL HYDROCHLORIDE 50 MG/1
50 TABLET ORAL EVERY 6 HOURS PRN
Qty: 10 TABLET | Refills: 0 | Status: SHIPPED | OUTPATIENT
Start: 2022-06-19 | End: 2022-06-24

## 2022-06-19 NOTE — ED PROVIDER NOTES
"Encounter Date: 6/18/2022       History     Chief Complaint   Patient presents with    Breast Problem     Pt arrives with c/o left breast pain and abscess x1 month     Patient reports left breast pain and suspected abscess for the past x1 month; pt reports the area "popped" today and had assoc drainage     The history is provided by the patient.   Abscess   This is a new problem. The current episode started several weeks ago. The problem has been rapidly improving. Affected Location: left breast - 9 o clock. The pain is at a severity of 5/10. The abscess is characterized by itchiness, redness and draining. Pertinent negatives include no anorexia, no decrease in physical activity, no fever, no diarrhea, no rhinorrhea and no sore throat. She has received no recent medical care.     Review of patient's allergies indicates:  No Known Allergies  No past medical history on file.  No past surgical history on file.  No family history on file.     Review of Systems   Constitutional: Negative for fever.   HENT: Negative for rhinorrhea and sore throat.    Eyes: Negative.    Respiratory: Negative for shortness of breath.    Cardiovascular: Negative for chest pain.   Gastrointestinal: Negative for anorexia, diarrhea and nausea.   Genitourinary: Negative for dysuria.   Musculoskeletal: Negative for back pain.   Skin: Negative for rash.   Neurological: Negative for weakness.   Hematological: Does not bruise/bleed easily.   Psychiatric/Behavioral: Negative.        Physical Exam     Initial Vitals [06/18/22 2351]   BP Pulse Resp Temp SpO2   107/66 60 16 98.6 °F (37 °C) 99 %      MAP       --         Physical Exam    Constitutional: She appears well-developed and well-nourished.   HENT:   Head: Normocephalic and atraumatic.   Eyes: EOM are normal.   Neck: Neck supple.   Normal range of motion.  Cardiovascular: Normal rate and regular rhythm.   Pulmonary/Chest: Breath sounds normal.   Abdominal: Abdomen is soft. Bowel sounds are " normal.   Musculoskeletal:         General: Normal range of motion.      Cervical back: Normal range of motion and neck supple.     Neurological: She is alert and oriented to person, place, and time.   Skin: Skin is warm and dry.        Left breast abscess; approx 1cm x 1cm - no current drainage or bleeding; no nipple discharge noted   Psychiatric: She has a normal mood and affect. Her behavior is normal. Judgment and thought content normal.         ED Course   Procedures  Labs Reviewed   POCT URINE PREGNANCY          Imaging Results    None          Medications - No data to display                       Clinical Impression:   Final diagnoses:  [N61.1] Breast abscess (Primary)          ED Disposition Condition    Discharge Stable        ED Prescriptions     Medication Sig Dispense Start Date End Date Auth. Provider    traMADoL (ULTRAM) 50 mg tablet Take 1 tablet (50 mg total) by mouth every 6 (six) hours as needed for Pain. 10 tablet 6/19/2022 6/24/2022 VIVIANA Ewing    sulfamethoxazole-trimethoprim 800-160mg (BACTRIM DS) 800-160 mg Tab Take 1 tablet by mouth 2 (two) times daily. for 7 days 14 tablet 6/19/2022 6/26/2022 VIVIANA Ewing        Follow-up Information    None          VIVIANA Ewing  06/19/22 0138

## 2022-09-21 ENCOUNTER — HISTORICAL (OUTPATIENT)
Dept: ADMINISTRATIVE | Facility: HOSPITAL | Age: 37
End: 2022-09-21

## 2022-09-27 ENCOUNTER — HOSPITAL ENCOUNTER (EMERGENCY)
Facility: HOSPITAL | Age: 37
Discharge: HOME | End: 2022-09-27
Attending: STUDENT IN AN ORGANIZED HEALTH CARE EDUCATION/TRAINING PROGRAM

## 2022-09-27 VITALS
SYSTOLIC BLOOD PRESSURE: 124 MMHG | TEMPERATURE: 98 F | OXYGEN SATURATION: 100 % | BODY MASS INDEX: 27.1 KG/M2 | RESPIRATION RATE: 16 BRPM | HEART RATE: 64 BPM | DIASTOLIC BLOOD PRESSURE: 83 MMHG | HEIGHT: 64 IN | WEIGHT: 158.75 LBS

## 2022-09-27 DIAGNOSIS — B96.89 LOCALIZED BACTERIAL SKIN INFECTION: Primary | ICD-10-CM

## 2022-09-27 DIAGNOSIS — L08.9 LOCALIZED BACTERIAL SKIN INFECTION: Primary | ICD-10-CM

## 2022-09-27 LAB
B-HCG UR QL: NEGATIVE
CTP QC/QA: YES
GLUCOSE SERPL-MCNC: 83 MG/DL (ref 70–110)
POCT GLUCOSE: 83 MG/DL (ref 70–110)

## 2022-09-27 PROCEDURE — 81025 URINE PREGNANCY TEST: CPT | Performed by: PHYSICIAN ASSISTANT

## 2022-09-27 PROCEDURE — 82962 GLUCOSE BLOOD TEST: CPT

## 2022-09-27 PROCEDURE — 99284 EMERGENCY DEPT VISIT MOD MDM: CPT

## 2022-09-27 RX ORDER — MUPIROCIN 20 MG/G
OINTMENT TOPICAL 3 TIMES DAILY
Qty: 22 G | Refills: 0 | Status: SHIPPED | OUTPATIENT
Start: 2022-09-27 | End: 2022-10-04

## 2022-09-27 RX ORDER — INDOMETHACIN 25 MG/1
25 CAPSULE ORAL
Qty: 15 CAPSULE | Refills: 0 | Status: SHIPPED | OUTPATIENT
Start: 2022-09-27 | End: 2022-10-02

## 2022-09-27 RX ORDER — SULFAMETHOXAZOLE AND TRIMETHOPRIM 800; 160 MG/1; MG/1
1 TABLET ORAL 2 TIMES DAILY
Qty: 14 TABLET | Refills: 0 | Status: SHIPPED | OUTPATIENT
Start: 2022-09-27 | End: 2022-10-04

## 2022-09-27 NOTE — ED PROVIDER NOTES
"Encounter Date: 9/27/2022       History     Chief Complaint   Patient presents with    Breast Problem     PT REPORTS "SORE" TO LT BREAST > 1 MONTH.  STATES SEEN FOR THIS SAME ISSUE BUT DID NOT RECEIVE RX FOR MEDS OR FU APT.      Patient reports to the ER with a sore/skin redness to her left breast that has been present for over x1 month; pt reports during her previous visit she never filled her prescriptions for antibiotics      Illness   The current episode started several weeks ago. The problem has been unchanged. The pain is at a severity of 3/10. Nothing relieves the symptoms. Pertinent negatives include no fever, no decreased vision, no abdominal pain, no nausea, no vomiting, no headaches, no rhinorrhea, no sore throat, no swollen glands, no neck pain, no shortness of breath, no rash, no pain and no eye redness. Services received include medications given. Recently, medical care has been given at this facility.   Review of patient's allergies indicates:  No Known Allergies  No past medical history on file.  No past surgical history on file.  No family history on file.     Review of Systems   Constitutional:  Negative for fever.   HENT:  Negative for rhinorrhea and sore throat.    Eyes: Negative.  Negative for pain and redness.   Respiratory:  Negative for shortness of breath.    Cardiovascular:  Negative for chest pain.   Gastrointestinal:  Negative for abdominal pain, nausea and vomiting.   Genitourinary:  Negative for dysuria.   Musculoskeletal:  Negative for back pain and neck pain.   Skin:  Negative for rash.   Neurological:  Negative for weakness and headaches.   Hematological:  Does not bruise/bleed easily.   Psychiatric/Behavioral: Negative.       Physical Exam     Initial Vitals [09/27/22 0930]   BP Pulse Resp Temp SpO2   124/83 64 16 97.5 °F (36.4 °C) 100 %      MAP       --         Physical Exam    Vitals reviewed.  Constitutional: She appears well-developed and well-nourished.   HENT:   Head: " Normocephalic and atraumatic.   Eyes: Conjunctivae and EOM are normal. Pupils are equal, round, and reactive to light.   Neck: Neck supple.   Normal range of motion.  Cardiovascular:  Normal rate, regular rhythm and normal heart sounds.           Pulmonary/Chest: Effort normal and breath sounds normal. No respiratory distress. She has no wheezes. She exhibits no tenderness.   Right breast exhibits no inverted nipple, no mass and no nipple discharge. Left breast exhibits no inverted nipple, no mass and no nipple discharge. No breast swelling or bleeding.   Skin abrasion present; no fluctuance or induration to suggest abscess or large cellulitis; npo drainage or warmth noted; appears to be skin abrasion/sore that repeated gets irritated from the patient's bra   Abdominal: Abdomen is soft. Bowel sounds are normal. There is no abdominal tenderness.   Genitourinary: No breast swelling or bleeding.   Musculoskeletal:         General: Normal range of motion.      Cervical back: Normal range of motion and neck supple.     Neurological: She is alert and oriented to person, place, and time. She displays normal reflexes. No cranial nerve deficit or sensory deficit.   Skin: Skin is warm and dry.   Psychiatric: She has a normal mood and affect. Her behavior is normal. Judgment and thought content normal.       ED Course   Procedures  Labs Reviewed   POCT URINE PREGNANCY   POCT GLUCOSE, HAND-HELD DEVICE   POCT GLUCOSE          Imaging Results    None          Medications - No data to display        APC / Resident Notes:   I was not physically present during the history, exam or disposition of this patient. I was available at all times for consultation. (Zmora)                   Clinical Impression:   Final diagnoses:  [L08.9, B96.89] Localized bacterial skin infection (Primary)        ED Disposition Condition    Discharge Stable          ED Prescriptions       Medication Sig Dispense Start Date End Date Auth. Provider     sulfamethoxazole-trimethoprim 800-160mg (BACTRIM DS) 800-160 mg Tab Take 1 tablet by mouth 2 (two) times daily. for 7 days 14 tablet 9/27/2022 10/4/2022 VIVIANA Ewing    mupirocin (BACTROBAN) 2 % ointment Apply topically 3 (three) times daily. for 7 days 22 g 9/27/2022 10/4/2022 VIVIANA Ewing    indomethacin (INDOCIN) 25 MG capsule Take 1 capsule (25 mg total) by mouth 3 (three) times daily with meals. for 5 days 15 capsule 9/27/2022 10/2/2022 VIVIANA Ewing          Follow-up Information       Follow up With Specialties Details Why Contact Info    discharge followup    If your symptoms become WORSE or you DO NOT IMPROVE and you are unable to reach your health care provider, you should RETURN to the emergency department    discharge info    Discussed all pertinent ED information, results, diagnosis and treatment plan; All questions and concerns were addressed at this time. Patient voices understanding of information and instructions. Patient is comfortable with plan and discharge    Breast Clinic    we will refer you for a PCP for Breast CLinic followup             VIVIANA Ewing  09/27/22 [id number]       Beau Alcaraz MD  09/27/22 [id number]